# Patient Record
Sex: MALE | Race: WHITE | NOT HISPANIC OR LATINO | Employment: FULL TIME | URBAN - METROPOLITAN AREA
[De-identification: names, ages, dates, MRNs, and addresses within clinical notes are randomized per-mention and may not be internally consistent; named-entity substitution may affect disease eponyms.]

---

## 2024-04-30 ENCOUNTER — OFFICE VISIT (OUTPATIENT)
Dept: GASTROENTEROLOGY | Facility: HOSPITAL | Age: 64
End: 2024-04-30
Payer: COMMERCIAL

## 2024-04-30 VITALS — BODY MASS INDEX: 21.47 KG/M2 | WEIGHT: 150 LBS | HEIGHT: 70 IN | HEART RATE: 80 BPM

## 2024-04-30 DIAGNOSIS — D36.9 ADENOMATOUS POLYPS: Primary | ICD-10-CM

## 2024-04-30 DIAGNOSIS — I25.10 CORONARY ARTERY DISEASE INVOLVING NATIVE HEART WITHOUT ANGINA PECTORIS, UNSPECIFIED VESSEL OR LESION TYPE: ICD-10-CM

## 2024-04-30 PROCEDURE — 99213 OFFICE O/P EST LOW 20 MIN: CPT | Performed by: INTERNAL MEDICINE

## 2024-04-30 PROCEDURE — 1036F TOBACCO NON-USER: CPT | Performed by: INTERNAL MEDICINE

## 2024-04-30 PROCEDURE — 99203 OFFICE O/P NEW LOW 30 MIN: CPT | Performed by: INTERNAL MEDICINE

## 2024-04-30 RX ORDER — POLYETHYLENE GLYCOL-3350 AND ELECTROLYTES 236; 6.74; 5.86; 2.97; 22.74 G/274.31G; G/274.31G; G/274.31G; G/274.31G; G/274.31G
4000 POWDER, FOR SOLUTION ORAL ONCE
Qty: 4000 ML | Refills: 0 | Status: SHIPPED | OUTPATIENT
Start: 2024-04-30 | End: 2024-04-30

## 2024-04-30 RX ORDER — ALBUTEROL SULFATE 90 UG/1
1-2 AEROSOL, METERED RESPIRATORY (INHALATION) EVERY 4 HOURS PRN
COMMUNITY
Start: 2015-12-29

## 2024-04-30 RX ORDER — BUDESONIDE 0.5 MG/2ML
0.5 INHALANT ORAL
COMMUNITY

## 2024-04-30 RX ORDER — ATORVASTATIN CALCIUM 80 MG/1
80 TABLET, FILM COATED ORAL NIGHTLY
COMMUNITY
Start: 2023-08-08

## 2024-04-30 RX ORDER — TICAGRELOR 90 MG/1
90 TABLET ORAL EVERY 12 HOURS
COMMUNITY
Start: 2023-11-17

## 2024-04-30 RX ORDER — BUDESONIDE AND FORMOTEROL FUMARATE DIHYDRATE 160; 4.5 UG/1; UG/1
2 AEROSOL RESPIRATORY (INHALATION) 2 TIMES DAILY
COMMUNITY
Start: 2017-01-04

## 2024-04-30 RX ORDER — NAPROXEN SODIUM 220 MG/1
81 TABLET, FILM COATED ORAL
COMMUNITY
Start: 2022-09-22

## 2024-04-30 ASSESSMENT — ENCOUNTER SYMPTOMS
MUSCULOSKELETAL NEGATIVE: 1
RESPIRATORY NEGATIVE: 1
GASTROINTESTINAL NEGATIVE: 1
CARDIOVASCULAR NEGATIVE: 1
EYES NEGATIVE: 1
ENDOCRINE NEGATIVE: 1
PSYCHIATRIC NEGATIVE: 1
HEMATOLOGIC/LYMPHATIC NEGATIVE: 1
CONSTITUTIONAL NEGATIVE: 1
NEUROLOGICAL NEGATIVE: 1

## 2024-04-30 NOTE — PROGRESS NOTES
"Adenomatous polyps  Coronary artery disease with stents and anticoagulation    History of Present Illness:   Liam Rolon is a 63 y.o. male with PMHx of coronary artery disease status postmyocardial infarction and stent placement on Brilinta and aspirin who presents to GI clinic for follow up.  Last seen around 2015 for colonoscopy.  He denies any rectal bleeding, chest pain, shortness of breath or abdominal pain.  He is due for surveillance colonoscopy.    I reviewed his last colonoscopy report    I reviewed the pathology    I reviewed his medications patient will be examined at the time of colonoscopy    Review of Systems  Review of Systems   Constitutional: Negative.    HENT: Negative.     Eyes: Negative.    Respiratory: Negative.     Cardiovascular: Negative.    Gastrointestinal: Negative.    Endocrine: Negative.    Genitourinary: Negative.    Musculoskeletal: Negative.    Skin: Negative.    Neurological: Negative.    Hematological: Negative.    Psychiatric/Behavioral: Negative.         Allergies  No Known Allergies    Medications  Current Outpatient Medications   Medication Instructions    albuterol 90 mcg/actuation inhaler 1-2 puffs, inhalation, Every 4 hours PRN    aspirin 81 mg, oral, Daily RT    atorvastatin (LIPITOR) 80 mg, oral, Nightly    Brilinta 90 mg, oral, Every 12 hours    budesonide (PULMICORT) 0.5 mg, nebulization, Daily RT    budesonide-formoteroL (Symbicort) 160-4.5 mcg/actuation inhaler 2 puffs, inhalation, 2 times daily        Objective   Visit Vitals  Pulse 80      Physical Exam  Patient is doing well will be examined at the time of colonoscopy    No results found for: \"WBC\", \"HGB\", \"HCT\", \"PLT\"  No results found for: \"NA\", \"K\", \"CL\", \"CO2\", \"BUN\", \"CREATININE\", \"CALCIUM\", \"PROT\", \"BILITOT\", \"ALKPHOS\", \"ALT\", \"AST\", \"GLUCOSE\"        Liam Rolon is a 63 y.o. male who presents to GI clinic for colonoscopy with history of coronary artery disease and on Brilinta.  He will call his cardiologist for " guidance on his Brilinta..    Coronary artery disease involving native heart without angina pectoris  Patient is a 63-year-old with history of asthma, coronary artery disease status post stent placement on Brilinta and aspirin, and who has no angina.  He is due to see his cardiologist and I have suggested he discuss with him whether he can stop Brilinta for 7 days prior to a colonoscopy.  All his questions were answered and he will do this.    Adenomatous polyps  Patient is a 63-year-old with a history of adenomatous polyps due for surveillance colonoscopy.  His last colonoscopy was 2015.  I discussed the procedure, sedation and will have read.  I discussed the preparation.  I also discussed the issue of his anticoagulation which she will discuss with cardiology.       Ajay Pacheco MD

## 2024-04-30 NOTE — ASSESSMENT & PLAN NOTE
Patient is a 63-year-old with a history of adenomatous polyps due for surveillance colonoscopy.  His last colonoscopy was 2015.  I discussed the procedure, sedation and will have read.  I discussed the preparation.  I also discussed the issue of his anticoagulation which she will discuss with cardiology.

## 2024-04-30 NOTE — ASSESSMENT & PLAN NOTE
Patient is a 63-year-old with history of asthma, coronary artery disease status post stent placement on Brilinta and aspirin, and who has no angina.  He is due to see his cardiologist and I have suggested he discuss with him whether he can stop Brilinta for 7 days prior to a colonoscopy.  All his questions were answered and he will do this.

## 2024-08-30 ENCOUNTER — APPOINTMENT (OUTPATIENT)
Dept: GASTROENTEROLOGY | Facility: EXTERNAL LOCATION | Age: 64
End: 2024-08-30
Payer: COMMERCIAL

## 2024-08-30 DIAGNOSIS — Z12.11 COLON CANCER SCREENING: Primary | ICD-10-CM

## 2024-08-30 DIAGNOSIS — Z86.010 HX OF COLONIC POLYPS: ICD-10-CM

## 2024-08-30 DIAGNOSIS — D36.9 ADENOMATOUS POLYPS: ICD-10-CM

## 2024-08-30 PROCEDURE — 45378 DIAGNOSTIC COLONOSCOPY: CPT | Performed by: INTERNAL MEDICINE
